# Patient Record
Sex: FEMALE | Race: WHITE | NOT HISPANIC OR LATINO | Employment: OTHER | URBAN - METROPOLITAN AREA
[De-identification: names, ages, dates, MRNs, and addresses within clinical notes are randomized per-mention and may not be internally consistent; named-entity substitution may affect disease eponyms.]

---

## 2019-12-19 ENCOUNTER — SURGERY - HEALTHEAST (OUTPATIENT)
Dept: CARDIOLOGY | Facility: CLINIC | Age: 54
End: 2019-12-19

## 2019-12-21 ASSESSMENT — MIFFLIN-ST. JEOR: SCORE: 1178.26

## 2019-12-23 ENCOUNTER — COMMUNICATION - HEALTHEAST (OUTPATIENT)
Dept: CARDIOLOGY | Facility: CLINIC | Age: 54
End: 2019-12-23

## 2019-12-23 DIAGNOSIS — I51.81 STRESS-INDUCED CARDIOMYOPATHY: ICD-10-CM

## 2019-12-27 ENCOUNTER — AMBULATORY - HEALTHEAST (OUTPATIENT)
Dept: CARDIAC REHAB | Facility: HOSPITAL | Age: 54
End: 2019-12-27

## 2019-12-27 DIAGNOSIS — I51.81 STRESS-INDUCED CARDIOMYOPATHY: ICD-10-CM

## 2019-12-27 DIAGNOSIS — Z98.890 S/P CORONARY ANGIOGRAM: ICD-10-CM

## 2020-01-07 ENCOUNTER — COMMUNICATION - HEALTHEAST (OUTPATIENT)
Dept: CARDIOLOGY | Facility: CLINIC | Age: 55
End: 2020-01-07

## 2020-01-07 ENCOUNTER — AMBULATORY - HEALTHEAST (OUTPATIENT)
Dept: CARDIAC REHAB | Facility: HOSPITAL | Age: 55
End: 2020-01-07

## 2020-01-07 DIAGNOSIS — I51.81 STRESS-INDUCED CARDIOMYOPATHY: ICD-10-CM

## 2020-01-09 ENCOUNTER — AMBULATORY - HEALTHEAST (OUTPATIENT)
Dept: CARDIAC REHAB | Facility: HOSPITAL | Age: 55
End: 2020-01-09

## 2020-01-09 DIAGNOSIS — I51.81 STRESS-INDUCED CARDIOMYOPATHY: ICD-10-CM

## 2020-01-13 ENCOUNTER — AMBULATORY - HEALTHEAST (OUTPATIENT)
Dept: CARDIAC REHAB | Facility: HOSPITAL | Age: 55
End: 2020-01-13

## 2020-01-13 DIAGNOSIS — I51.81 STRESS-INDUCED CARDIOMYOPATHY: ICD-10-CM

## 2020-01-15 ENCOUNTER — AMBULATORY - HEALTHEAST (OUTPATIENT)
Dept: CARDIAC REHAB | Facility: HOSPITAL | Age: 55
End: 2020-01-15

## 2020-01-15 DIAGNOSIS — I51.81 STRESS-INDUCED CARDIOMYOPATHY: ICD-10-CM

## 2020-01-21 ENCOUNTER — HOSPITAL ENCOUNTER (OUTPATIENT)
Dept: CARDIOLOGY | Facility: CLINIC | Age: 55
Discharge: HOME | End: 2020-01-21
Attending: INTERNAL MEDICINE

## 2020-01-21 DIAGNOSIS — I51.81 STRESS-INDUCED CARDIOMYOPATHY: ICD-10-CM

## 2020-01-21 ASSESSMENT — MIFFLIN-ST. JEOR: SCORE: 1190.96

## 2020-01-22 ENCOUNTER — COMMUNICATION - HEALTHEAST (OUTPATIENT)
Dept: CARDIOLOGY | Facility: CLINIC | Age: 55
End: 2020-01-22

## 2020-01-22 LAB
AORTIC ROOT: 3.1 CM
AORTIC VALVE MEAN VELOCITY: 86.9 CM/S
ASCENDING AORTA: 4.1 CM
AV DIMENSIONLESS INDEX VTI: 0.9
AV MEAN GRADIENT: 4 MMHG
AV PEAK GRADIENT: 8.2 MMHG
AV VALVE AREA: 3.1 CM2
AV VELOCITY RATIO: 1
BSA FOR ECHO PROCEDURE: 1.68 M2
CV BLOOD PRESSURE: ABNORMAL MMHG
CV ECHO HEIGHT: 64 IN
CV ECHO WEIGHT: 138 LBS
DOP CALC AO PEAK VEL: 143 CM/S
DOP CALC AO VTI: 28.4 CM
DOP CALC LVOT AREA: 3.46 CM2
DOP CALC LVOT DIAMETER: 2.1 CM
DOP CALC LVOT PEAK VEL: 148 CM/S
DOP CALC LVOT STROKE VOLUME: 88.6 CM3
DOP CALCLVOT PEAK VEL VTI: 25.6 CM
EJECTION FRACTION: 71 % (ref 55–75)
FRACTIONAL SHORTENING: 37.2 % (ref 28–44)
INTERVENTRICULAR SEPTUM IN END DIASTOLE: 1 CM (ref 0.6–0.9)
IVS/PW RATIO: 1
LA AREA 1: 15.4 CM2
LA AREA 2: 18.8 CM2
LEFT ATRIUM LENGTH: 4.74 CM
LEFT ATRIUM SIZE: 3.3 CM
LEFT ATRIUM VOLUME INDEX: 30.9 ML/M2
LEFT ATRIUM VOLUME: 51.9 ML
LEFT VENTRICLE CARDIAC INDEX: 3.4 L/MIN/M2
LEFT VENTRICLE CARDIAC OUTPUT: 5.7 L/MIN
LEFT VENTRICLE DIASTOLIC VOLUME INDEX: 46.4 CM3/M2 (ref 29–61)
LEFT VENTRICLE DIASTOLIC VOLUME: 78 CM3 (ref 46–106)
LEFT VENTRICLE HEART RATE: 64 BPM
LEFT VENTRICLE MASS INDEX: 84.8 G/M2
LEFT VENTRICLE SYSTOLIC VOLUME INDEX: 13.7 CM3/M2 (ref 8–24)
LEFT VENTRICLE SYSTOLIC VOLUME: 23 CM3 (ref 14–42)
LEFT VENTRICULAR INTERNAL DIMENSION IN DIASTOLE: 4.3 CM (ref 3.8–5.2)
LEFT VENTRICULAR INTERNAL DIMENSION IN SYSTOLE: 2.7 CM (ref 2.2–3.5)
LEFT VENTRICULAR MASS: 142.5 G
LEFT VENTRICULAR OUTFLOW TRACT MEAN GRADIENT: 3 MMHG
LEFT VENTRICULAR OUTFLOW TRACT MEAN VELOCITY: 80.1 CM/S
LEFT VENTRICULAR OUTFLOW TRACT PEAK GRADIENT: 9 MMHG
LEFT VENTRICULAR POSTERIOR WALL IN END DIASTOLE: 1 CM (ref 0.6–0.9)
LV STROKE VOLUME INDEX: 52.8 ML/M2
MITRAL VALVE E/A RATIO: 0.8
MV AVERAGE E/E' RATIO: 10.7 CM/S
MV DECELERATION TIME: 201 MS
MV E'TISSUE VEL-LAT: 8.09 CM/S
MV E'TISSUE VEL-MED: 4 CM/S
MV LATERAL E/E' RATIO: 8
MV MEDIAL E/E' RATIO: 16.2
MV PEAK A VELOCITY: 78.5 CM/S
MV PEAK E VELOCITY: 64.7 CM/S
NUC REST DIASTOLIC VOLUME INDEX: 2208 LBS
NUC REST SYSTOLIC VOLUME INDEX: 64 IN
TRICUSPID REGURGITATION PEAK PRESSURE GRADIENT: 14.6 MMHG
TRICUSPID VALVE ANULAR PLANE SYSTOLIC EXCURSION: 1.9 CM
TRICUSPID VALVE PEAK REGURGITANT VELOCITY: 191 CM/S

## 2020-01-27 ENCOUNTER — COMMUNICATION - HEALTHEAST (OUTPATIENT)
Dept: CARDIOLOGY | Facility: CLINIC | Age: 55
End: 2020-01-27

## 2020-01-27 ENCOUNTER — OFFICE VISIT - HEALTHEAST (OUTPATIENT)
Dept: CARDIOLOGY | Facility: CLINIC | Age: 55
End: 2020-01-27

## 2020-01-27 DIAGNOSIS — I77.819 AORTIC DILATATION (H): ICD-10-CM

## 2020-01-27 DIAGNOSIS — I51.81 STRESS-INDUCED CARDIOMYOPATHY: ICD-10-CM

## 2020-01-27 DIAGNOSIS — I10 IDIOPATHIC HYPERTENSION: ICD-10-CM

## 2020-01-27 ASSESSMENT — MIFFLIN-ST. JEOR: SCORE: 1204.57

## 2020-01-28 ENCOUNTER — COMMUNICATION - HEALTHEAST (OUTPATIENT)
Dept: CARDIOLOGY | Facility: CLINIC | Age: 55
End: 2020-01-28

## 2020-01-29 ENCOUNTER — AMBULATORY - HEALTHEAST (OUTPATIENT)
Dept: CARDIAC REHAB | Facility: HOSPITAL | Age: 55
End: 2020-01-29

## 2020-01-29 DIAGNOSIS — Z98.890 S/P CORONARY ANGIOGRAM: ICD-10-CM

## 2020-02-03 ENCOUNTER — AMBULATORY - HEALTHEAST (OUTPATIENT)
Dept: CARDIAC REHAB | Facility: HOSPITAL | Age: 55
End: 2020-02-03

## 2020-02-03 DIAGNOSIS — I51.81 STRESS-INDUCED CARDIOMYOPATHY: ICD-10-CM

## 2020-02-05 ENCOUNTER — AMBULATORY - HEALTHEAST (OUTPATIENT)
Dept: CARDIAC REHAB | Facility: HOSPITAL | Age: 55
End: 2020-02-05

## 2020-02-05 DIAGNOSIS — I51.81 STRESS-INDUCED CARDIOMYOPATHY: ICD-10-CM

## 2020-02-10 ENCOUNTER — AMBULATORY - HEALTHEAST (OUTPATIENT)
Dept: CARDIAC REHAB | Facility: HOSPITAL | Age: 55
End: 2020-02-10

## 2020-02-10 DIAGNOSIS — I51.81 STRESS-INDUCED CARDIOMYOPATHY: ICD-10-CM

## 2020-02-12 ENCOUNTER — AMBULATORY - HEALTHEAST (OUTPATIENT)
Dept: CARDIAC REHAB | Facility: HOSPITAL | Age: 55
End: 2020-02-12

## 2020-02-12 DIAGNOSIS — I51.81 STRESS-INDUCED CARDIOMYOPATHY: ICD-10-CM

## 2020-02-17 ENCOUNTER — AMBULATORY - HEALTHEAST (OUTPATIENT)
Dept: CARDIAC REHAB | Facility: HOSPITAL | Age: 55
End: 2020-02-17

## 2020-02-17 DIAGNOSIS — Z98.890 S/P CORONARY ANGIOGRAM: ICD-10-CM

## 2020-02-19 ENCOUNTER — AMBULATORY - HEALTHEAST (OUTPATIENT)
Dept: CARDIAC REHAB | Facility: HOSPITAL | Age: 55
End: 2020-02-19

## 2020-02-19 DIAGNOSIS — Z98.890 S/P CORONARY ANGIOGRAM: ICD-10-CM

## 2020-02-24 ENCOUNTER — AMBULATORY - HEALTHEAST (OUTPATIENT)
Dept: CARDIAC REHAB | Facility: HOSPITAL | Age: 55
End: 2020-02-24

## 2020-02-24 DIAGNOSIS — Z98.890 S/P CORONARY ANGIOGRAM: ICD-10-CM

## 2020-03-12 ENCOUNTER — COMMUNICATION - HEALTHEAST (OUTPATIENT)
Dept: CARDIOLOGY | Facility: CLINIC | Age: 55
End: 2020-03-12

## 2020-03-16 ENCOUNTER — OFFICE VISIT - HEALTHEAST (OUTPATIENT)
Dept: CARDIOLOGY | Facility: CLINIC | Age: 55
End: 2020-03-16

## 2020-03-16 ENCOUNTER — HOSPITAL ENCOUNTER (OUTPATIENT)
Dept: RADIOLOGY | Facility: CLINIC | Age: 55
Discharge: HOME | End: 2020-03-16
Attending: INTERNAL MEDICINE

## 2020-03-16 DIAGNOSIS — R07.2 PRECORDIAL PAIN: ICD-10-CM

## 2020-03-16 ASSESSMENT — MIFFLIN-ST. JEOR: SCORE: 1192.32

## 2020-03-17 ENCOUNTER — COMMUNICATION - HEALTHEAST (OUTPATIENT)
Dept: CARDIOLOGY | Facility: CLINIC | Age: 55
End: 2020-03-17

## 2020-06-08 ENCOUNTER — COMMUNICATION - HEALTHEAST (OUTPATIENT)
Dept: CARDIOLOGY | Facility: CLINIC | Age: 55
End: 2020-06-08

## 2020-06-08 ENCOUNTER — AMBULATORY - HEALTHEAST (OUTPATIENT)
Dept: CARDIOLOGY | Facility: CLINIC | Age: 55
End: 2020-06-08

## 2020-06-08 DIAGNOSIS — I51.81 STRESS-INDUCED CARDIOMYOPATHY: ICD-10-CM

## 2020-06-18 ENCOUNTER — AMBULATORY - HEALTHEAST (OUTPATIENT)
Dept: CARDIOLOGY | Facility: CLINIC | Age: 55
End: 2020-06-18

## 2020-06-18 ENCOUNTER — COMMUNICATION - HEALTHEAST (OUTPATIENT)
Dept: CARDIOLOGY | Facility: CLINIC | Age: 55
End: 2020-06-18

## 2020-06-18 DIAGNOSIS — I77.819 AORTIC DILATATION (H): ICD-10-CM

## 2020-06-18 DIAGNOSIS — I51.81 STRESS-INDUCED CARDIOMYOPATHY: ICD-10-CM

## 2020-06-18 DIAGNOSIS — R07.2 PRECORDIAL PAIN: ICD-10-CM

## 2020-06-23 ENCOUNTER — HOSPITAL ENCOUNTER (OUTPATIENT)
Dept: CARDIOLOGY | Facility: HOSPITAL | Age: 55
Discharge: HOME | End: 2020-06-23
Attending: INTERNAL MEDICINE

## 2020-06-23 DIAGNOSIS — I77.819 AORTIC DILATATION (H): ICD-10-CM

## 2020-06-23 DIAGNOSIS — R07.2 PRECORDIAL PAIN: ICD-10-CM

## 2020-06-23 DIAGNOSIS — I51.81 STRESS-INDUCED CARDIOMYOPATHY: ICD-10-CM

## 2020-06-23 LAB
AORTIC ROOT: 3.4 CM
DOP CALC LVOT AREA: 3.46 CM2
DOP CALC LVOT DIAMETER: 2.1 CM
DOP CALC LVOT PEAK VEL: 111 CM/S
DOP CALC LVOT STROKE VOLUME: 68.9 CM3
DOP CALCLVOT PEAK VEL VTI: 19.9 CM
EJECTION FRACTION: 68 % (ref 55–75)
FRACTIONAL SHORTENING: 46.3 % (ref 28–44)
INTERVENTRICULAR SEPTUM IN END DIASTOLE: 1.18 CM (ref 0.6–0.9)
IVS/PW RATIO: 1.3
LA AREA 1: 8 CM2
LA AREA 2: 14 CM2
LEFT ATRIUM LENGTH: 3.4 CM
LEFT ATRIUM SIZE: 3 CM
LEFT ATRIUM TO AORTIC ROOT RATIO: 0.88 NO UNITS
LEFT ATRIUM VOLUME: 28 ML
LEFT VENTRICLE DIASTOLIC VOLUME: 81.2 CM3 (ref 46–106)
LEFT VENTRICLE SYSTOLIC VOLUME: 26.3 CM3 (ref 14–42)
LEFT VENTRICULAR INTERNAL DIMENSION IN DIASTOLE: 4.49 CM (ref 3.8–5.2)
LEFT VENTRICULAR INTERNAL DIMENSION IN SYSTOLE: 2.41 CM (ref 2.2–3.5)
LEFT VENTRICULAR MASS: 161.5 G
LEFT VENTRICULAR OUTFLOW TRACT MEAN GRADIENT: 3 MMHG
LEFT VENTRICULAR OUTFLOW TRACT MEAN VELOCITY: 84.5 CM/S
LEFT VENTRICULAR OUTFLOW TRACT PEAK GRADIENT: 5 MMHG
LEFT VENTRICULAR POSTERIOR WALL IN END DIASTOLE: 0.9 CM (ref 0.6–0.9)
MITRAL VALVE E/A RATIO: 1
MV AVERAGE E/E' RATIO: 8.4 CM/S
MV DECELERATION TIME: 306 MS
MV E'TISSUE VEL-LAT: 10.5 CM/S
MV E'TISSUE VEL-MED: 6.58 CM/S
MV LATERAL E/E' RATIO: 6.8
MV MEDIAL E/E' RATIO: 10.9
MV PEAK A VELOCITY: 72.3 CM/S
MV PEAK E VELOCITY: 71.8 CM/S
TRICUSPID VALVE ANULAR PLANE SYSTOLIC EXCURSION: 2.4 CM

## 2020-06-23 ASSESSMENT — MIFFLIN-ST. JEOR: SCORE: 1190.96

## 2020-06-24 ENCOUNTER — COMMUNICATION - HEALTHEAST (OUTPATIENT)
Dept: CARDIOLOGY | Facility: CLINIC | Age: 55
End: 2020-06-24

## 2020-07-28 ENCOUNTER — OFFICE VISIT - HEALTHEAST (OUTPATIENT)
Dept: CARDIOLOGY | Facility: CLINIC | Age: 55
End: 2020-07-28

## 2020-07-28 DIAGNOSIS — I51.81 STRESS-INDUCED CARDIOMYOPATHY: ICD-10-CM

## 2020-07-28 DIAGNOSIS — I10 IDIOPATHIC HYPERTENSION: ICD-10-CM

## 2020-07-28 DIAGNOSIS — I77.819 AORTIC DILATATION (H): ICD-10-CM

## 2020-07-28 RX ORDER — ESCITALOPRAM OXALATE 10 MG/1
10 TABLET ORAL DAILY
Status: SHIPPED | COMMUNITY
Start: 2020-06-17

## 2021-01-21 ENCOUNTER — COMMUNICATION - HEALTHEAST (OUTPATIENT)
Dept: CARDIOLOGY | Facility: CLINIC | Age: 56
End: 2021-01-21

## 2021-01-21 DIAGNOSIS — I10 IDIOPATHIC HYPERTENSION: ICD-10-CM

## 2021-02-17 ENCOUNTER — COMMUNICATION - HEALTHEAST (OUTPATIENT)
Dept: CARDIOLOGY | Facility: CLINIC | Age: 56
End: 2021-02-17

## 2021-02-17 DIAGNOSIS — I51.81 STRESS-INDUCED CARDIOMYOPATHY: ICD-10-CM

## 2021-02-17 DIAGNOSIS — I77.819 AORTIC DILATATION (H): ICD-10-CM

## 2021-02-17 DIAGNOSIS — I51.7 LEFT VENTRICULAR HYPERTROPHY: ICD-10-CM

## 2021-03-12 ENCOUNTER — HOSPITAL ENCOUNTER (OUTPATIENT)
Dept: MRI IMAGING | Facility: HOSPITAL | Age: 56
Discharge: HOME | End: 2021-03-12
Attending: INTERNAL MEDICINE

## 2021-03-12 DIAGNOSIS — I77.819 AORTIC DILATATION (H): ICD-10-CM

## 2021-03-12 DIAGNOSIS — I51.7 LEFT VENTRICULAR HYPERTROPHY: ICD-10-CM

## 2021-03-12 DIAGNOSIS — I51.81 STRESS-INDUCED CARDIOMYOPATHY: ICD-10-CM

## 2021-03-12 LAB
CCTA EJECTION FRACTION: 82 %
CCTA INTERVENTRICULAR SETPUM: 0.7 CM (ref 0.6–1.1)
CCTA LEFT INTERNAL DIMENSION IN SYSTOLE: 2.6 CM (ref 2.1–4)
CCTA LEFT VENTRICULAR INTERNAL DIMENSION IN DIASTOLE: 5 CM (ref 3.5–6)
CCTA LEFT VENTRICULAR MASS: 104.64 G
CCTA POSTERIOR WALL: 0.6 CM (ref 0.6–1.1)
MR CARDIAC LEFT VENTRIAL CARDIAC INDEX: 2.6 L/MIN/M2 (ref 1.75–3.8)
MR CARDIAC LEFT VENTRICAL CARDIAC OUTPUT: 4.27 L/MIN (ref 2.8–8.8)
MR CARDIAC LEFT VENTRICULAR DIASTOLIC VOLUME INDEX: 62.84 ML/M2 (ref 41–81)
MR CARDIAC LEFT VENTRICULAR MASS INDEX: 66.43 G/M2 (ref 63–95)
MR CARDIAC LEFT VENTRICULAR MASS: 111 G (ref 75–175)
MR CARDIAC LEFT VENTRICULAR STROKE VOLUME INDEX: 45.48 ML/M2 (ref 26–56)
MR CARDIAC LEFT VENTRICULAR SYSTOLIC VOLUME INDEX: 17.36 ML/M2 (ref 12–20)
MR EJECTION FRACTION: 72.38 %
MR HEIGHT: 1.63 M
MR LEFT VENTRICULAR DYSTOLIC VOLUMEN: 105 ML (ref 52–141)
MR LEFT VENTRICULAR STROKE VOLUMEN: 76 ML (ref 33–97)
MR LEFT VENTRICULAR SYSTOLIC VOLUME: 29 ML (ref 13–51)
MR WEIGHT: 62.6 KG

## 2021-03-16 ENCOUNTER — COMMUNICATION - HEALTHEAST (OUTPATIENT)
Dept: CARDIOLOGY | Facility: CLINIC | Age: 56
End: 2021-03-16

## 2021-03-30 ENCOUNTER — OFFICE VISIT - HEALTHEAST (OUTPATIENT)
Dept: CARDIOLOGY | Facility: CLINIC | Age: 56
End: 2021-03-30

## 2021-03-30 DIAGNOSIS — I10 IDIOPATHIC HYPERTENSION: ICD-10-CM

## 2021-03-30 RX ORDER — LOSARTAN POTASSIUM 50 MG/1
50 TABLET ORAL DAILY
Qty: 30 TABLET | Refills: 12 | Status: SHIPPED | OUTPATIENT
Start: 2021-03-30

## 2021-03-30 ASSESSMENT — MIFFLIN-ST. JEOR: SCORE: 1209.11

## 2021-04-20 ENCOUNTER — TRANSFERRED RECORDS (OUTPATIENT)
Dept: HEALTH INFORMATION MANAGEMENT | Facility: CLINIC | Age: 56
End: 2021-04-20

## 2021-06-04 VITALS
RESPIRATION RATE: 14 BRPM | BODY MASS INDEX: 24.07 KG/M2 | SYSTOLIC BLOOD PRESSURE: 128 MMHG | DIASTOLIC BLOOD PRESSURE: 80 MMHG | WEIGHT: 141 LBS | HEART RATE: 69 BPM | HEIGHT: 64 IN

## 2021-06-04 VITALS — HEIGHT: 64 IN | WEIGHT: 138 LBS | BODY MASS INDEX: 23.56 KG/M2

## 2021-06-04 VITALS — WEIGHT: 137 LBS | BODY MASS INDEX: 23.52 KG/M2

## 2021-06-04 VITALS — BODY MASS INDEX: 23.52 KG/M2 | WEIGHT: 137 LBS

## 2021-06-04 VITALS — BODY MASS INDEX: 23.56 KG/M2 | WEIGHT: 138 LBS | HEIGHT: 64 IN

## 2021-06-04 VITALS
HEART RATE: 84 BPM | WEIGHT: 138.3 LBS | SYSTOLIC BLOOD PRESSURE: 112 MMHG | RESPIRATION RATE: 20 BRPM | HEIGHT: 64 IN | BODY MASS INDEX: 23.61 KG/M2 | DIASTOLIC BLOOD PRESSURE: 80 MMHG

## 2021-06-04 VITALS — WEIGHT: 136 LBS | BODY MASS INDEX: 23.34 KG/M2

## 2021-06-04 VITALS — BODY MASS INDEX: 23.08 KG/M2 | WEIGHT: 135.2 LBS | HEIGHT: 64 IN

## 2021-06-04 VITALS — BODY MASS INDEX: 23.69 KG/M2 | WEIGHT: 138 LBS

## 2021-06-04 VITALS — WEIGHT: 138 LBS | BODY MASS INDEX: 23.69 KG/M2

## 2021-06-04 NOTE — TELEPHONE ENCOUNTER
Received a call back from patient. She was transferred to scheduling to have repeat echo prior to seeing ROSA in 4 weeks. She has a post-angio follow up with Jazmín on 1/7/20. Will inquire if this appt is appropriate in addition to appt with ROSA on 1/27/20.      Christine Banerjee plans to follow up with her PMD and then you on 1/27/20 with an echocardiogram on 1/20/20 prior. She has an appt with Jazmín post-pci on 1/7/20. Does she need this appt?  Thanks,  Mal

## 2021-06-04 NOTE — TELEPHONE ENCOUNTER
Received a message from Dr. Diaz on Hospital Physician Discharge line with orders for patient to see WTZ in 4 weeks post-discharge and to have an echocardiogram prior to this due to stress-induced cardiomyopathy. Order placed for echo. LM with patient to assist with appt and echo. -[facility] [place]

## 2021-06-04 NOTE — PROGRESS NOTES
ITP ASSESSMENT   Assessment Day: Initial    Session Number: 1--2  Precautions: Standard    Diagnosis: Other (Stress Induced Cardiomyopathy)    Risk Stratification: Medium    Referring Provider: Vinayak Beckman MD  EXERCISE  Exercise Assessment: Initial       6 Minute Walk Test   Pre   Pre Exercise HR: 73                    Pre Exercise BP: 125/85      Peak  Peak HR: 102                   Peak BP: 146/77    Peak feet: 1600    Peak O2 SAT: 100    Peak RPE: 4    Peak MPH: 3.03      Symptoms:  Peak Symptoms: none      5 mins. Post  5 Min Post HR: 74    5 Min Post BP: 124/77                           Exercise Plan  Goals Next 30 days   STG-- Pt will tolerate CR for 40 mins at 3.5-4.5 met levels, 2-3 x a week without sx's or EKG changes. Pt will resume exercise at the gym for 40-45 mins. LTG-- Pt will resume all housework, PT work at a [employer], play pickleball with met levels of 5-6.       Education Goals: All goals in this section met    Education Goals Met: Patient can state cardiac s/s and appropriate emergency response.;Has system for taking medication.;Medication review.      Exercise Prescription  Exercise Mode: Treadmill;Bike;Nustep;Arm Erg.    Frequency: 2-3 x week    Duration: 40 min    Intensity / THR: 20-30 beats above resting heart rate (or THR )    RPE 11-14  Progression / Met level: 3.5-4.5    Resistive Training?: Yes      Current Exercise (mins/week): 5      Interventions  Home Exercise:  Mode: TM    Frequency: 3-4 x week    Duration: 30-40 min      Education Material : Educational videos;Provide written material;Individual education and counseling;Offer educational classes      Education Completed  Exercise Education Completed: Cardiac Anatomy;Signs and Symptoms;Medication review;RPE;Emergency Plan;Home Exercise;Warm up/cool down;FITT Principles;BP/HR Reponse to exercise;Benefits of Exercise;End point of exercise              Exercise Follow-up/Discharge  Follow up/Discharge: Skilled therapy  "needed to monitor for CV sx's and EKG changes with increasing workloads. Provide education and support for risk factor management/Stress management and resuming of home exercise after heart event.   NUTRITION  Nutrition Assessment: Initial      Nutrition Risk Factors:  Nutrition Risk Factors: NA      Nutrition Plan  Interventions  Other Nutrition Intervention: Diet Class;Therapist/Pt Discussion;Educational Videos;Provide with Written Material      Education Completed  Nutrition Education Completed: Risk factor overview      Goals  Nutrition Goals (Next 30 days): Patient will follow a low sodium diet;Patient will follow a low saturated fat diet      Goals Met  Nutrition Goals Met: Completed Nutritional Risk Screen;Provided Rate your Plate Survey;Reviewed Dietitian schedule      Height, Weight, and  BMI  Weight: 138 lb (62.6 kg)  Height: 5' 4\" (1.626 m)  BMI: 23.68      Nutrition Follow-up  Follow-up/Discharge: Enc pt to return diet suvey and consult with the dietitian         Other Risk Factors  Other Risk Factor Assessment: Initial      HTN Risk Factor: Hypertension      Pre Exercise BP: 125/85  Post Exercise BP: 124/77      Hypertension Plan  Goals  HTN Goals: Follow low sodium diet;Take medication as prescribed;Exercises regularly      Goals Met  HTN Goals Met: Take medication as prescribed      HTN Interventions  HTN Interventions: Diet consult;Therapist/patient discussion;Provide written material;Offer educational videos;Offer educational classes      HTN Education Completed  HTN Education Completed: Medication review;Risk factor overview      Tobacco Risk Factor: NA      Risk Factor Follow-up   Follow-up/Discharge: Provide education and support for risk factor management and stress management     PSYCHOSOCIAL  Psychosocial Assessment: Initial       [employer] Q of L Summary Score: 17      PHQ-9 Total Score: 3      Psychosocial Risk Factor: Stress      Psychosocial Plan  Interventions    Interventions: Offer " educational videos and classes;Provide written material;Individual education and counseling       Education Completed  Education Completed: Relaxation/Coping Techniques;S/S of depression;Effects of stress on body      Goals  Goals (Next 30 days): Identify stressors;Improvement in [employer] CO[employer] score;Practicing stress management skills      Goals Met  Goals Met: Identified Support system;Oriented to stress management classes      Psychosocial Follow-up  Follow-up/Discharge: BAYLEE survey given and results discussed with pt. Pt will choose a stress management technique to practice and will also attend Stress management class.         Patient involved in Goal setting?: Yes      Signature: _____________________________________________________________    Date: __________________    Time: __________________See Doc Flowsheet

## 2021-06-04 NOTE — PROGRESS NOTES
Cardiac Rehab  Phase II Assessment    Assessment Date: 12-27-19    Diagnosis: Stress Induced Cardiomyopathy  Date of Onset: 12-19-19  Procedure: Angiogram- No CAD  Date of Onset: 12-19-19  ICD/Pacemaker: No   Post-procedure Complications: none  ECG History: NSR/ST   EF%:41%  Past Medical History:   Patient Active Problem List   Diagnosis     Stress-induced cardiomyopathy     Idiopathic hypertension     Past Medical History:   Diagnosis Date     Chest pain with normal coronary angiography 12/19/2019     Past Surgical History:   Procedure Laterality Date     CV CORONARY ANGIOGRAM N/A 12/19/2019    Procedure: Coronary Angiogram;  Surgeon: Vinayak Beckman MD;  Location: [facility];  Service: Cardiology     CV LEFT HEART CATHETERIZATION WITH LEFT VENTRICULOGRAM N/A 12/19/2019    Procedure: Left Heart Catheterization with Left Ventriculogram;  Surgeon: Vinayak Beckman MD;  Location: [facility];  Service: Cardiology         Physical Assessment  Precautions/ Physical Limitations: Standard  Oxygen: No  O2 Sats: 100% Lung Sounds: Clear Edema: none  Incisions: na  Sleeping Pattern: good   Appetite: good   Nutrition Risk Screen: no risk at this time    Pain  Location: none  Intensity: (0-10 scale) 0      Psychosocial/ Emotional Health  1. In the past 12 months, have you been in a relationship where you have been abused physically, emotionally, sexually or financially? No  notified: NA  2. Who do you turn to for emotional support?:   3. Do you have cultural or spiritual needs? No  4. Have there been any major life changes in the past 12 months? No    Referral Information  Primary Physician: Krystal Lr PA-C  Cardiologist: Emily  Surgeon: len    Home exercise/Equipment: Belongs to a gym    Patient's long-term goal(s): Return to all previous activities     1. Living Accommodations: [facility] Steps: Yes      Support people at home:    2. Marital Status:   3.  Family is able to assist with cares      Yazidism/Community involvement: no  4. Recreation/Hobbies: Travel, Pickle Ball        See Doc Flowsheet

## 2021-06-04 NOTE — TELEPHONE ENCOUNTER
Received a voicemail back from Christine giving permission to cancel 1/7/19 appt with JOANNE in post-intervention follow up. Called schedulers and this appt was removed. She will follow up as schedule with WTHALLE and echo prior. -[facility] [place]

## 2021-06-05 VITALS
DIASTOLIC BLOOD PRESSURE: 92 MMHG | RESPIRATION RATE: 14 BRPM | HEIGHT: 64 IN | SYSTOLIC BLOOD PRESSURE: 140 MMHG | WEIGHT: 142 LBS | BODY MASS INDEX: 24.24 KG/M2 | HEART RATE: 68 BPM

## 2021-06-05 NOTE — PROGRESS NOTES
ITP ASSESSMENT   Assessment Day: 30 Day    Session Number: 6  Precautions: Standard    Diagnosis: Other (Stress Induced Cardiomyopathy)    Risk Stratification: Medium    Referring Provider: Krystal Lr PA-C   ITP: Dr. Shine  EXERCISE  Exercise Assessment: Reassessment    Pt tolerates 30-40 minutes of exercise at 4.2 mets without symptoms or ekg changes.                         Exercise Plan  Goals Next 30 days  ADL'S: STG-- Pt will tolerate CR for 40 mins at 4.2-5.2 met levels, 2-3 x a week without sx's or EKG changes. Pt will resume exercise at the gym for 40-45 mins. LTG-- Pt will resume all housework, PT work at a [employer], play pickleball with met levels of 5-6.     Education Goals: All goals in this section met    Education Goals Met: Patient can state cardiac s/s and appropriate emergency response.;Has system for taking medication.;Medication review.                        Goals Met  Initial ADL's goals met: Goal Met - Pt is exercising at 4.2 mets without complaints.    Initial Progression: Pt is progressing towards goals as tolerated.    Exercise Prescription  Exercise Mode: Treadmill;Bike;Nustep;Arm Erg.    Frequency: 2-3x/week    Duration: 40 Minutes    Intensity / THR: 20-30 beats above resting heart rate ()    RPE 11-14  Progression / Met level: 4.2-5.2    Resistive Training?: Yes    Current Exercise (mins/week): 100    Interventions  Home Exercise:  Mode: Walk    Frequency: 4-6x/week    Duration: 30-40 Minutes    Education Material : Educational videos;Provide written material;Individual education and counseling;Offer educational classes    Education Completed  Exercise Education Completed: Cardiac Anatomy;Signs and Symptoms;Medication review;RPE;Emergency Plan;Home Exercise;Warm up/cool down;FITT Principles;BP/HR Reponse to exercise;Benefits of Exercise;End point of exercise            Exercise Follow-up/Discharge  Follow up/Discharge: Skilled therapy needed to monitor for CV sx's and EKG  "changes with increasing workloads. Provide education and support for risk factor management/Stress management and resuming of home exercise after heart event.   NUTRITION  Nutrition Assessment: Reassessment    Nutrition Risk Factors:  Nutrition Risk Factors: NA    Nutrition Plan  Interventions  Other Nutrition Intervention: Diet Class;Therapist/Pt Discussion;Educational Videos;Provide with Written Material    Education Completed  Nutrition Education Completed: Risk factor overview    Goals  Nutrition Goals (Next 30 days): Patient will follow a low sodium diet;Patient will follow a low saturated fat diet    Goals Met  Nutrition Goals Met: Completed Nutritional Risk Screen;Provided Rate your Plate Survey;Reviewed Dietitian schedule    Height, Weight, and  BMI  Weight: 138 lb (62.6 kg)  Height: 5' 4\" (1.626 m)  BMI: 23.68    Nutrition Follow-up  Follow-up/Discharge: Enc pt to return diet suvey and consult with the dietitian       Other Risk Factors  Other Risk Factor Assessment: Reassessment    HTN Risk Factor: Hypertension    Pre Exercise BP: 108/68  Post Exercise BP: 110/70    Hypertension Plan  Goals  HTN Goals: Follow low sodium diet;Take medication as prescribed;Exercises regularly    Goals Met  HTN Goals Met: Take medication as prescribed    HTN Interventions  HTN Interventions: Diet consult;Therapist/patient discussion;Provide written material;Offer educational videos;Offer educational classes    HTN Education Completed  HTN Education Completed: Medication review;Risk factor overview      Tobacco Risk Factor: NA    Risk Factor Follow-up   Follow-up/Discharge: Provide education and support for risk factor management and stress management   PSYCHOSOCIAL  Psychosocial Assessment: Reassessment    [facility] Q of L Summary Score: 17    PHQ-9 Total Score: 3    Psychosocial Risk Factor: Stress    Psychosocial Plan  Interventions  Interventions: Offer educational videos and classes;Provide written " material;Individual education and counseling    Education Completed  Education Completed: Relaxation/Coping Techniques;S/S of depression;Effects of stress on body    Goals  Goals (Next 30 days): Identify stressors;Improvement in [employer] CO[employer] score;Practicing stress management skills    Goals Met  Goals Met: Identified Support system;Oriented to stress management classes    Psychosocial Follow-up  Follow-up/Discharge: BAYLEE survey given and results discussed with pt. Pt will choose a stress management technique to practice and will also attend Stress management class.       Patient involved in Goal setting?: No      Signature: _____________________________________________________________    Date: __________________    Time: __________________

## 2021-06-05 NOTE — TELEPHONE ENCOUNTER
"----- Message from Franck Diaz MD (Ted) sent at 1/28/2020  3:12 PM CST -----  Regarding: RE: ativan    I meant \"give her Ativan 1 mg nightly as needed\"  ----- Message -----  From: Trish Salvador RN  Sent: 1/28/2020   2:41 PM CST  To: Franck Diaz MD (Ted)  Subject: RE: ativan                                       Ok. When you say gave- does this mean you already called it it or I should call in 1 mg? Qty 2 tabs.  ----- Message -----  From: Franck Diaz MD (Ted)  Sent: 1/28/2020   2:01 PM CST  To: Trish Salvador RN  Subject: RE: ativan                                       I gave her 1 mg instead  ----- Message -----  From: Trish Salvador RN  Sent: 1/28/2020   1:28 PM CST  To: Franck Diaz MD (Ted)  Subject: ativan                                           Hi,  I responded to Christine to see if your reassurance was sufficient more or less. I would need to call in the Ativan to her pharmacy but are we sure about 2 mg?Just double checking! Call me if needed and I can explain better.  Thanks,  Mal         Called in Rx for ativan 1 mg at bedtime for anxiousness and sleep with a Qty of 2 tablets. Pharmacist took call in. Updated Christine and she verbalized understanding of instruction and thanked writer. She will start with half a tablet. She says that was her dose approximately 5 years ago. She will follow up with PMD for further anxiety management. [place] [place]                "

## 2021-06-05 NOTE — PATIENT INSTRUCTIONS - HE
Christine Somers,    It was a pleasure to see you today at the [facility].     My recommendations after this visit include:    start  losartan for BP and enlarged aorta  Echo in 6 months    RAIMUNDO Diaz MD, FACC, [facility]

## 2021-06-06 NOTE — PROGRESS NOTES
ITP ASSESSMENT   Assessment Day: 60 Day    Session Number: 13  Precautions: Standard    Diagnosis: Other (Stress Induced Cardiomyopathy)    Risk Stratification: Medium    Referring Provider: Krystal Lr PA-C   ITP: Dr. Shine  EXERCISE  Exercise Assessment: Reassessment    Pt currently tolerates 40 minutes of exercise at 5.2 mets without cardiac symptoms or ekg changes.                         Exercise Plan  Goals Next 30 days  ADL'S: STG-- Pt will resume exercise at the gym for 40-45 mins, 4-7x/week at 5.2-6 mets level. LTG-- Pt will resume all housework, PT work at a [employer], play pickleball with met levels of 5-6.     Education Goals: All goals in this section met    Education Goals Met: Patient can state cardiac s/s and appropriate emergency response.;Has system for taking medication.;Medication review.                        Goals Met  30 day ADL'S goals met: Goal Met - Pt is exercising at 4.9-5 mets for 40 minutes and has resumed most activities as tolerated.    30 Day Progression: Pt is progressing well towards her goals, pt has resumed working PT, resuming household chores as tolerated and has returned to the gym on day she is not at [facility].    Initial ADL's goals met: Goal Met - Pt is exercising at 4.2 mets without complaints.    Initial Progression: Pt is progressing towards goals as tolerated.      Exercise Prescription  Exercise Mode: Treadmill;Bike;Nustep;Arm Erg.    Frequency: 2-3x/week    Duration: 40 Minutres    Intensity / THR: 20-30 beats above resting heart rate ()    RPE 11-14  Progression / Met level: 5.2-6    Resistive Training?: Yes    Current Exercise (mins/week): 120    Interventions  Home Exercise:  Mode: Walk    Frequency: 4-6x/week    Duration: 30-40 Minutes    Education Material : Educational videos;Provide written material;Individual education and counseling;Offer educational classes    Education Completed  Exercise Education Completed: Cardiac Anatomy;Signs and  "Symptoms;Medication review;RPE;Emergency Plan;Home Exercise;Warm up/cool down;FITT Principles;BP/HR Reponse to exercise;Benefits of Exercise;End point of exercise            Exercise Follow-up/Discharge  Follow up/Discharge: Skilled therapy needed to monitor for CV sx's and EKG changes with increasing workloads. Provide education and support for risk factor management/Stress management and resuming of home exercise after heart event.   NUTRITION  Nutrition Assessment: Reassessment    Nutrition Risk Factors:  Nutrition Risk Factors: NA    Nutrition Plan  Interventions  Other Nutrition Intervention: Diet Class;Therapist/Pt Discussion;Educational Videos;Provide with Written Material    Education Completed  Nutrition Education Completed: Risk factor overview    Goals  Nutrition Goals (Next 30 days): Patient will follow a low sodium diet;Patient will follow a low saturated fat diet    Goals Met  Nutrition Goals Met: Completed Nutritional Risk Screen;Provided Rate your Plate Survey;Reviewed Dietitian schedule    Height, Weight, and  BMI  Weight: 137 lb (62.1 kg)  Height: 5' 4\" (1.626 m)  BMI: 23.5      Nutrition Follow-up  Follow-up/Discharge: Enc pt to return diet suvey and consult with the dietitian         Other Risk Factors  Other Risk Factor Assessment: Reassessment    HTN Risk Factor: Hypertension    Pre Exercise BP: 104/64  Post Exercise BP: 110/76    Hypertension Plan  Goals  HTN Goals: Follow low sodium diet    Goals Met  HTN Goals Met: Take medication as prescribed;Exercises regularly    HTN Interventions  HTN Interventions: Diet consult;Therapist/patient discussion;Provide written material;Offer educational videos;Offer educational classes    HTN Education Completed  HTN Education Completed: Medication review;Risk factor overview      Tobacco Risk Factor: NA    Risk Factor Follow-up   Follow-up/Discharge: Provide education and support for risk factor management and stress management   PSYCHOSOCIAL  Psychosocial " Assessment: Reassessment      Psychosocial Risk Factor: Stress    Psychosocial Plan  Interventions  Interventions: Offer educational videos and classes;Provide written material;Individual education and counseling    Education Completed  Education Completed: Relaxation/Coping Techniques;S/S of depression;Effects of stress on body    Goals  Goals (Next 30 days): Identify stressors;Improvement in [employer] CO[employer] score;Practicing stress management skills    Goals Met  Goals Met: Identified Support system;Oriented to stress management classes    Psychosocial Follow-up  Follow-up/Discharge: BAYLEE survey given and results discussed with pt. Pt will choose a stress management technique to practice and will also attend Stress management class.             Patient involved in Goal setting?: Yes      Signature: _____________________________________________________________    Date: __________________    Time: __________________

## 2021-06-06 NOTE — PROGRESS NOTES
ITP ASSESSMENT   Assessment Day: 90 Day    Session Number: 14  Precautions: Standard     Diagnosis: Other (Stress Induced Cardiomyopathy)    Risk Stratification: Medium    Referring Provider: Krystal Lr PA-C  EXERCISE  Exercise Assessment: Reassessment    Tolerates 40' of exercise at 4.9 mets                           Exercise Plan  Goals Next 30 days  : STG-- Pt will resume exercise at the gym for 40-45 mins, 4-7x/week at 5.2-6 mets level. LTG-- Pt will resume all housework, PT work at a [employer], play pickleball with met levels of 5-6.       Education Goals: All goals in this section met    Education Goals Met: Patient can state cardiac s/s and appropriate emergency response.;Has system for taking medication.;Medication review.                          Goals Met  60 day ADL'S goals met: Will evaluate  goals when pt returns  she has been off since 2/24/2020    60 Day Progression: Will evaluate goals when pt returs       30 day ADL'S goals met: Goal Met - Pt is exercising at 4.9-5 mets for 40 minutes and has resumed most activities as tolerated.  30 Day Progression: Pt is progressing well towards her goals, pt has resumed working PT, resuming household chores as tolerated and has returned to the gym on day she is not at [facility].      Initial ADL's goals met: Goal Met - Pt is exercising at 4.2 mets without complaints.    Initial Progression: Pt is progressing towards goals as tolerated.      Exercise Prescription  Exercise Mode: Treadmill;Bike;Nustep;Arm Erg.    Frequency: 2-3 x weekly    Duration: 40'    Intensity / THR: 20-30 beats above resting heart rate ()    RPE 11-14  Progression / Met level: 4.9-6    Resistive Training?: Yes      Current Exercise (mins/week): 120      Interventions  Home Exercise:  Mode: Walk    Frequency: 4-6 x week    Duration: 40'      Education Material : Educational videos;Provide written material;Individual education and counseling;Offer educational classes      Education  "Completed  Exercise Education Completed: Cardiac Anatomy;Signs and Symptoms;Medication review;RPE;Emergency Plan;Home Exercise;Warm up/cool down;FITT Principles;BP/HR Reponse to exercise;Benefits of Exercise;End point of exercise              Exercise Follow-up/Discharge  Follow up/Discharge: Skilled therapy needed to monitor for CV sx's and EKG changes with increasing workloads. Provide education and support for risk factor management/Stress management and resuming of home exercise after heart event.   NUTRITION  Nutrition Assessment: Reassessment      Nutrition Risk Factors:  Nutrition Risk Factors: NA      Nutrition Plan  Interventions  Other Nutrition Intervention: Diet Class;Therapist/Pt Discussion;Educational Videos;Provide with Written Material      Education Completed  Nutrition Education Completed: Risk factor overview      Goals  Nutrition Goals (Next 30 days): Patient will follow a low sodium diet;Patient will follow a low saturated fat diet      Goals Met  Nutrition Goals Met: Completed Nutritional Risk Screen;Provided Rate your Plate Survey;Reviewed Dietitian schedule      Height, Weight, and  BMI  Weight: 137 lb (62.1 kg) (137lb)  Height: 5' 4\" (1.626 m)  BMI: 23.5      Nutrition Follow-up  Follow-up/Discharge: Enc pt to return diet suvey and consult with the dietitian         Other Risk Factors  Other Risk Factor Assessment: Reassessment      HTN Risk Factor: Hypertension      Pre Exercise BP: 110/72  Post Exercise BP: 102/68      Hypertension Plan  Goals  HTN Goals: Follow low sodium diet      Goals Met  HTN Goals Met: Take medication as prescribed;Exercises regularly      HTN Interventions  HTN Interventions: Diet consult;Therapist/patient discussion;Provide written material;Offer educational videos;Offer educational classes      HTN Education Completed  HTN Education Completed: Medication review;Risk factor overview      Tobacco Risk Factor: NA        Risk Factor Follow-up   Follow-up/Discharge: " Will provide ed and suupport   as needed     PSYCHOSOCIAL  Psychosocial Assessment: Reassessment      Psychosocial Risk Factor: Stress      Psychosocial Plan  Interventions  Interventions: Offer educational videos and classes;Provide written material;Individual education and counseling      Education Completed  Education Completed: Relaxation/Coping Techniques;S/S of depression;Effects of stress on body      Goals  Goals (Next 30 days): Identify stressors;[facility];Practicing stress management skills      Goals Met  Goals Met: Identified Support system;Oriented to stress management classes      Psychosocial Follow-up  Follow-up/Discharge: Will review stress/ relaxation techniqes when pt resumes             Patient involved in Goal setting?: No      Signature: _____________________________________________________________    Date: __________________    Time: __________________

## 2021-06-06 NOTE — PATIENT INSTRUCTIONS - HE
Christine Somers,    It was a pleasure to see you today at the [facility].     My recommendations after this visit include:    Chest pain is suspicious for musculoskeletal origin  Chest x-ray    RAIMUNDO Diaz MD, FACC, [facility]

## 2021-06-07 NOTE — PROGRESS NOTES
ITP ASSESSMENT   Assessment Day: 120 Day    Session Number: 0  Precautions: Standard    Diagnosis: Other (Stress Induced Cardiomyopathy)    Risk Stratification: Medium    Referring Provider: Krystal Lr PA-C  EXERCISE  Exercise Assessment: Reassessment    Pt on hold due to COVID-19 precautions, no changes from previous ITP                         Exercise Plan  Goals Next 30 days  STG-- Pt will resume exercise at the gym for 40-45 mins, 4-7x/week at 5.2-6 mets level. LTG-- Pt will resume all housework, PT work at a [employer], play pickleball with met levels of 5-6.       Education Goals: All goals in this section met    Education Goals Met: Patient can state cardiac s/s and appropriate emergency response.;Has system for taking medication.;Medication review.                          Goals Met    90 Day Progress: Pt on hold due to COVID-19 precautions. No changes from previous ITP      60 day ADL'S goals met: Will evaluate  goals when pt returns  she has been off since 2/24/2020    60 Day Progression: Will evaluate goals when pt returs       30 day ADL'S goals met: Goal Met - Pt is exercising at 4.9-5 mets for 40 minutes and has resumed most activities as tolerated.    30 Day Progression: Pt is progressing well towards her goals, pt has resumed working PT, resuming household chores as tolerated and has returned to the gym on day she is not at [facility].      Initial ADL's goals met: Goal Met - Pt is exercising at 4.2 mets without complaints.    Initial Progression: Pt is progressing towards goals as tolerated.      Exercise Prescription  Exercise Mode: Treadmill;Bike;Nustep;Arm Erg.    Frequency: 2x/week    Duration: 40 minutes    Intensity / THR: 20-30 beats above resting heart rate ()    RPE 11-14  Progression / Met level: 4.9-6    Resistive Training?: Yes      Current Exercise (mins/week): 120      Interventions  Home Exercise:  Mode: Walk    Frequency: 4-6 days/week    Duration: 40 minutes      Education Material  ": Educational videos;Provide written material;Individual education and counseling;Offer educational classes      Education Completed  Exercise Education Completed: Cardiac Anatomy;Signs and Symptoms;Medication review;RPE;Emergency Plan;Home Exercise;Warm up/cool down;FITT Principles;BP/HR Reponse to exercise;Benefits of Exercise;End point of exercise              Exercise Follow-up/Discharge  Follow up/Discharge: Skilled therapy needed to monitor for CV sx's and EKG changes with increasing workloads. Provide education and support for risk factor management/Stress management and resuming of home exercise after heart event.   NUTRITION  Nutrition Assessment: Reassessment      Nutrition Risk Factors:  Nutrition Risk Factors: NA      Nutrition Plan  Interventions    Other Nutrition Intervention: Diet Class;Therapist/Pt Discussion;Educational Videos;Provide with Written Material      Education Completed  Nutrition Education Completed: Risk factor overview;Low Saturated fat diet;Low sodium diet;Weight management      Goals  Nutrition Goals (Next 30 days): Patient will follow a low sodium diet;Patient will follow a low saturated fat diet      Goals Met  Nutrition Goals Met: Completed Nutritional Risk Screen;Provided Rate your Plate Survey;Reviewed Dietitian schedule      Height, Weight, and  BMI  Weight: 137 lb (62.1 kg)  Height: 5' 4\" (1.626 m)  BMI: 23.5      Nutrition Follow-up  Follow-up/Discharge: Enc pt to return diet suvey and consult with the dietitian         Other Risk Factors  Other Risk Factor Assessment: Reassessment      HTN Risk Factor: Hypertension      Hypertension Plan  Goals  HTN Goals: Follow low sodium diet      Goals Met  HTN Goals Met: Take medication as prescribed;Exercises regularly      HTN Interventions  HTN Interventions: Diet consult;Therapist/patient discussion;Provide written material;Offer educational videos;Offer educational classes      HTN Education Completed  HTN Education Completed: " Medication review;Risk factor overview      Tobacco Risk Factor: NA      Risk Factor Follow-up   Follow-up/Discharge: Will provide ed and support as needed     PSYCHOSOCIAL  Psychosocial Assessment: Reassessment       [facility] Q [facility] L Summary Score: 17      PHQ-9 Total Score: 3      Psychosocial Risk Factor: Stress      Psychosocial Plan  Interventions  Interventions: Offer educational videos and classes;Provide written material;Individual education and counseling      Education Completed  Education Completed: Relaxation/Coping Techniques;S/S of depression;Effects of stress on body      Goals  Goals (Next 30 days): Identify stressors;Improvement in [employer] COOP score;Practicing stress management skills      Goals Met  Goals Met: Identified Support system;Oriented to stress management classes      Psychosocial Follow-up  Follow-up/Discharge: Will review stress/ relaxation techniqes when pt resumes             Patient involved in Goal setting?: No      Signature: _____________________________________________________________    Date: __________________    Time: __________________

## 2021-06-08 NOTE — PROGRESS NOTES
ITP ASSESSMENT   Assessment Day: 150 Day    Session Number: 0  Precautions: Standard    Diagnosis: Other (Stress Induced Cardiomyopathy)    Risk Stratification: Medium    Referring Provider: Krystal Lr PA-C   ITP: Dr. Shine  EXERCISE  Exercise Assessment: Discharge    Pt has been on hold due to Covid-19. Pt was tolerating 5 mets of exercise for up to 40 minutes without cardiovascular symptoms or EKG changes. Pt was contacted regarding resuming CR but has not returned phone call. We will be discharging her from our care.                         Exercise Plan    Education Goals: All goals in this section met    Education Goals Met: Patient can state cardiac s/s and appropriate emergency response.;Has system for taking medication.;Medication review.                          Goals Met  120 Day Progress: Pt has been on hold due to Covid-19. Pt has not returned phone call to resume CR, therefore we will be discharging her from our care.    90 Day Progress: Pt on hold due to COVID-19 precautions. No changes from previous ITP      60 day ADL'S goals met: Will evaluate  goals when pt returns  she has been off since 2/24/2020    60 Day Progression: Will evaluate goals when pt returs       30 day ADL'S goals met: Goal Met - Pt is exercising at 4.9-5 mets for 40 minutes and has resumed most activities as tolerated.    30 Day Progression: Pt is progressing well towards her goals, pt has resumed working PT, resuming household chores as tolerated and has returned to the gym on day she is not at [facility].      Exercise Prescription  Exercise Mode: Treadmill;Bike;Nustep;Arm Erg.    Frequency: 2x/week    Duration: 40 minutes    Intensity / THR: 20-30 beats above resting heart rate ()    RPE 11-14  Progression / Met level: 4.9-6    Resistive Training?: Yes      Current Exercise (mins/week): 120      Interventions  Home Exercise:  Mode: Walk    Frequency: 4-6x/week    Duration:  40 Minutes      Education Material : Educational  "videos;Provide written material;Individual education and counseling;Offer educational classes      Education Completed  Exercise Education Completed: Cardiac Anatomy;Signs and Symptoms;Medication review;RPE;Emergency Plan;Home Exercise;Warm up/cool down;FITT Principles;BP/HR Reponse to exercise;Benefits of Exercise;End point of exercise              Exercise Follow-up/Discharge  Follow up/Discharge: Skilled therapy needed to monitor for CV sx's and EKG changes with increasing workloads. Provide education and support for risk factor management/Stress management and resuming of home exercise after heart event.   NUTRITION  Nutrition Assessment: Discharge      Nutrition Risk Factors:  Nutrition Risk Factors: NA      Nutrition Plan  Interventions  Other Nutrition Intervention: Diet Class;Therapist/Pt Discussion;Educational Videos;Provide with Written Material      Education Completed  Nutrition Education Completed: Risk factor overview;Low Saturated fat diet;Low sodium diet;Weight management      Goals  Nutrition Goals (Next 30 days): Patient will follow a low sodium diet;Patient will follow a low saturated fat diet      Goals Met  Nutrition Goals Met: Completed Nutritional Risk Screen;Provided Rate your Plate Survey;Reviewed Dietitian schedule      Height, Weight, and  BMI  Weight: 137 lb (62.1 kg)  Height: 5' 4\" (1.626 m)  BMI: 23.5    Pre BMI: 23.5     Nutrition Follow-up  Follow-up/Discharge: Enc pt to return diet suvey and consult with the dietitian         Other Risk Factors  Other Risk Factor Assessment: Discharge      HTN Risk Factor: Hypertension      Hypertension Plan  Goals  HTN Goals: Follow low sodium diet      Goals Met  HTN Goals Met: Take medication as prescribed;Exercises regularly      HTN Interventions  HTN Interventions: Diet consult;Therapist/patient discussion;Provide written material;Offer educational videos;Offer educational classes      HTN Education Completed  HTN Education Completed: " Medication review;Risk factor overview      Tobacco Risk Factor: NA      Risk Factor Follow-up   Follow-up/Discharge: Will provide ed and support as needed     PSYCHOSOCIAL  Psychosocial Assessment: Discharge    [facility]: 17     Pre PHQ-9 Total Score: 3     Psychosocial Risk Factor: Stress      Psychosocial Plan  Interventions  If PHQ-9 is >9, send letter to MD  Interventions: Offer educational videos and classes;Provide written material;Individual education and counseling      Education Completed  Education Completed: Relaxation/Coping Techniques;S/S of depression;Effects of stress on body      Goals  Goals (Next 30 days): Identify stressors;Improvement in [place] COOP score;Practicing stress management skills      Goals Met  Goals Met: Identified Support system;Oriented to stress management classes      Psychosocial Follow-up  Follow-up/Discharge: Will review stress/ relaxation techniqes when pt resumes         Patient involved in Goal setting?: No      Signature: _____________________________________________________________    Date: __________________    Time: __________________

## 2021-06-08 NOTE — PROGRESS NOTES
Plan:  Start losartan 25 mg a day for blood pressure and enlargement of the aorta  Reassess aortic regurgitation and size of the aorta with echo in 6 months  We discussed lifestyle modifications including regular exercise and avoidance of excessive alcohol use.  I cautioned against heavy lifting     Echo and follow-up in 6 months

## 2021-06-10 NOTE — PATIENT INSTRUCTIONS - HE
Christine Somers,    It was a pleasure to see you today at the [facility].     My recommendations after this visit include:    Continue current medications  Cardiac MR next year before follow-up visit    RAIMUNDO Diaz MD, FACC, [facility]

## 2021-06-15 NOTE — TELEPHONE ENCOUNTER
Order is in; pt updated via [employer] that central scheduling will reach out to arrange. -[facility] [place]

## 2021-06-15 NOTE — TELEPHONE ENCOUNTER
MRI is asking if need aorta as well.  Per note - aorta is needed.    Valvular heart disease, follow up   Dx: Aortic dilatation (H) [I77.819 (ICD-10-CM)]; Stress-induced cardiomyopathy [I51.81 (ICD-10-CM)]; Left ventricular hypertrophy [I51.7 (ICD-10-CM)]   Comments: Assessment/Plan:   Chest pain, likely musculoskeletal, resolved   Stress cardiomyopathy, resolved   Hypertension, labile, element of whitecoat hypertension   Mild dilatation of ascending aorta, stable   Mild aortic regurgitation secondary to aortic dilatation, stable, asymptomatic   Mild left ventricular hypertrophy likely due to hypertensive heart, possible latent hypertrophic cardiomyopathy   Family history of sudden death           We discussed the results of the recent echo.  LV systolic function remains normal.  There is only trace to mild aortic regurgitation.  She brought up issue of left ventricular hypertrophy noted on the echo.  She reminded me that her sister  exercising in her 30s.  She did not have pre-existing cardiac condition or autopsy.   I offered her cardiac MR to further define LV hypertrophy.  She would like to postpone the testing until next year.   We will perform cardiac MR next year to assess LV hypertrophy, aortic valve regurgitation and size of the ascending aorta

## 2021-06-16 PROBLEM — I51.81 STRESS-INDUCED CARDIOMYOPATHY: Status: ACTIVE | Noted: 2019-12-19

## 2021-06-16 PROBLEM — R07.2 PRECORDIAL PAIN: Status: ACTIVE | Noted: 2020-03-16

## 2021-06-16 PROBLEM — I77.819 AORTIC DILATATION (H): Status: ACTIVE | Noted: 2020-01-27

## 2021-06-16 NOTE — PATIENT INSTRUCTIONS - HE
Christine Somers,    It was a pleasure to see you today at the [facility].     My recommendations after this visit include:    Increase losartan to 50 mg a day  Ideal blood pressure should be around 120/85.  If it stays higher then that, contact me    We will do echocardiogram next year    RAIMUNDO Diaz MD, FACC, [facility]

## 2021-06-17 NOTE — TELEPHONE ENCOUNTER
Telephone Encounter by Trish Salvador RN at 2021  9:33 AM     Author: Trish Salvador RN Service: -- Author Type: Registered Nurse    Filed: 2021  9:38 AM Encounter Date: 2021 Status: Signed    : Trish Salvador RN (Registered Nurse)       Per 2020 OV note with [facility]:    Assessment/Recommendations   Assessment/Plan:  Chest pain, likely musculoskeletal, resolved  Stress cardiomyopathy, resolved  Hypertension, labile, element of whitecoat hypertension  Mild dilatation of ascending aorta, stable  Mild aortic regurgitation secondary to aortic dilatation, stable, asymptomatic  Mild left ventricular hypertrophy likely due to hypertensive heart, possible latent hypertrophic cardiomyopathy  Family history of sudden death        We discussed the results of the recent echo.  LV systolic function remains normal.  There is only trace to mild aortic regurgitation.  She brought up issue of left ventricular hypertrophy noted on the echo.  She reminded me that her sister  exercising in her 30s.  She did not have pre-existing cardiac condition or autopsy.  I offered her cardiac MR to further define LV hypertrophy.  She would like to postpone the testing until next year.  We will perform cardiac MR next year to assess LV hypertrophy, aortic valve regurgitation and size of the ascending aorta           Per [employer] message from patient:    Christine Somers S  to Franck Diaz MD (Ted)          21 9:11 AM  I was to be contacted for follow up testing for my enlarged aorta.  Dr NERI and I had discussed an MRI prior to my visit with him.    I would like to get these appointments scheduled.              KRISTINE Banerjee- I placed order for Cardiac MR based on your last OV with Christine in July. Recall is in 1 year with cardiac MR prior. She sent you a message to have this done now- would you be ok with her having this now or should I have her wait until summer?   Thanks,  Mal

## 2021-06-28 NOTE — PROGRESS NOTES
"Progress Notes by Franck Diaz MD (Ted) at 1/27/2020  8:50 AM     Author: Franck Diaz MD (Ted) Service: -- Author Type: Physician    Filed: 1/27/2020  9:48 AM Encounter Date: 1/27/2020 Status: Signed    : Franck Diaz MD (Ted) (Physician)           Cardiology Progress Note    Assessment:  Stress cardiomyopathy resolved  Hypertension, labile, element of whitecoat hypertension  Mild dilatation of ascending aorta  Mild aortic regurgitation secondary to aortic dilatation      Plan:  Start losartan 25 mg a day for blood pressure and enlargement of the aorta  Reassess aortic regurgitation and size of the aorta with echo in 6 months  We discussed lifestyle modifications including regular exercise and avoidance of excessive alcohol use.  I cautioned against heavy lifting    Echo and follow-up in 6 months    Subjective:   This is 55 y.o. female who comes in today for follow-up visit.  She reports no new cardiac symptoms.  She is been attending cardiac rehab program.  She has not had chest pain or shortness of breath.  She denies heart palpitations.    Review of Systems:   General: WNL  Eyes: WNL  Ears/Nose/Throat: WNL  Lungs: WNL  Heart: WNL  Stomach: WNL  Bladder: WNL  Muscle/Joints: Joint Pain  Skin: WNL  Nervous System: WNL  Mental Health: Anxiety     Blood: WNL    Objective:   /80 (Patient Site: Left Arm, Patient Position: Sitting, Cuff Size: Adult Regular)   Pulse 69   Resp 14   Ht 5' 4\" (1.626 m)   Wt 141 lb (64 kg)   BMI 24.20 kg/m    Physical Exam:  GENERAL: no distress  NECK: No JVD  LUNGS: Clear to auscultation.  CARDIAC: regular rhythm, S1 & S2 normal.  No heaves, thrills, gallops or murmurs.  ABDOMEN: flat, negative hepatosplenomegaly, soft and non-tender.  EXTREMITIES: No evidence of cyanosis, clubbing or edema.    Current Outpatient Medications   Medication Sig Dispense Refill   ? aspirin 81 mg chewable tablet Chew 1 tablet (81 mg total) daily.  0 "   ? losartan (COZAAR) 25 MG tablet Take 1 tablet (25 mg total) by mouth daily. 30 tablet 12     No current facility-administered medications for this visit.        Cardiographics:      Echocardiogram: January 2020    Normal left ventricular size. Mild sigmoid left ventricular hypertrophy    Left ventricle ejection fraction is normal. Left ventricular ejection fraction estimated 55 to 60%    Normal right ventricular size and systolic function.    Mild aortic regurgitation    Mild mitral regurgitation    Mild enlargement of the ascending aorta measuring 4.1 cm    When compared to the previous study dated 12/20/2019, the previously described wall motion abnormality has resolved in the interim.    Coronary angio: December 2019  Normal coronary angiogram  LV gram showed large apical wma consistent with stress cardiomyopathy  Lab Results:       No results found for: CHOL  No results found for: HDL  No results found for: LDLCALC  No results found for: TRIG  No results found for: BNP    Franck (Jeb)  MD Emily

## 2021-06-28 NOTE — PROGRESS NOTES
"Progress Notes by Franck Diaz MD (Ted) at 3/16/2020  2:50 PM     Author: Franck Diaz MD (Ted) Service: -- Author Type: Physician    Filed: 3/16/2020  3:18 PM Encounter Date: 3/16/2020 Status: Signed    : Franck Diaz MD (Ted) (Physician)           Cardiology Progress Note    Assessment:  Chest pain, likely musculoskeletal  Stress cardiomyopathy resolved  Hypertension, labile, element of whitecoat hypertension  Mild dilatation of ascending aorta  Mild aortic regurgitation secondary to aortic dilatation    Plan:  She had normal physical exam exam.  I reassured her that stress cardiomyopathy or aortic dilatation/regurgitation has not worsen.  Chest x-ray    Follow-up in 6 months    Subjective:   This is 55 y.o. female who comes in today for follow-up for follow-up visit.  She developed left-sided chest pain.  The pain is located in the upper part of the chest close to left clavicle.  The pain is nonexertional and unrelated to motion of the arms.  She can reproduce some of the pain with palpations of the upper chest.  There is no associated shortness of breath or heart palpitations.  She has been able to exercise without provocation of the same chest discomfort.    Review of Systems:   General: WNL  Eyes: WNL  Ears/Nose/Throat: WNL  Lungs: WNL  Heart: Chest Pain, Arm Pain  Stomach: WNL  Bladder: WNL  Muscle/Joints: WNL  Skin: WNL  Nervous System: WNL  Mental Health: Anxiety     Blood: WNL    Objective:   /80 (Patient Site: Left Arm, Patient Position: Sitting, Cuff Size: Adult Regular)   Pulse 84   Resp 20   Ht 5' 4\" (1.626 m)   Wt 138 lb 4.8 oz (62.7 kg)   BMI 23.74 kg/m    Physical Exam:  GENERAL: no distress  NECK: No JVD  LUNGS: Clear to auscultation.  CARDIAC: regular rhythm, S1 & S2 normal.  No heaves, thrills, gallops or murmurs.  ABDOMEN: flat, negative hepatosplenomegaly, soft and non-tender.  EXTREMITIES: No evidence of cyanosis, clubbing or " edema.    Current Outpatient Medications   Medication Sig Dispense Refill   ? aspirin 81 mg chewable tablet Chew 1 tablet (81 mg total) daily.  0   ? LORazepam (ATIVAN) 1 MG tablet TAKE 1 TABLET BY MOUTH DAILY AS NEEDED FOR UP TO 30 DAYS.     ? losartan (COZAAR) 25 MG tablet Take 1 tablet (25 mg total) by mouth daily. 30 tablet 12     No current facility-administered medications for this visit.        Cardiographics:    Echocardiogram: January 2020    Normal left ventricular size. Mild sigmoid left ventricular hypertrophy    Left ventricle ejection fraction is normal. Left ventricular ejection fraction estimated 55 to 60%    Normal right ventricular size and systolic function.    Mild aortic regurgitation    Mild mitral regurgitation    Mild enlargement of the ascending aorta measuring 4.1 cm    When compared to the previous study dated 12/20/2019, the previously described wall motion abnormality has resolved in the interim.     Coronary angio: December 2019  Normal coronary angiogram  LV gram showed large apical wma consistent with stress cardiomyopathy      Franck Diaz MD (Ted)

## 2021-06-29 NOTE — PROGRESS NOTES
"Progress Notes by Franck Diaz MD (Ted) at 2020 10:10 AM     Author: Franck Diaz MD (Ted) Service: -- Author Type: Physician    Filed: 2020 10:36 AM Encounter Date: 2020 Status: Signed    : Franck Diaz MD (Ted) (Physician)           The patient has been notified of following:     \"This video visit will be conducted via a call between you and your physician/provider. We have found that certain health care needs can be provided without the need for an in-person physical exam.  This service lets us provide the care you need with a video conversation.  If a prescription is necessary we can send it directly to your pharmacy.  If lab work is needed we can place an order for that and you can then stop by our lab to have the test done at a later time.      Patient has given verbal consent to a Video visit? Yes    HEART CARE VIDEO ENCOUNTER        The patient has chosen to have the visit conducted as a video visit, to reduce risk of exposure given the current status of Coronavirus in our community. This video visit is being conducted via a call between the patient and physician/provider. Health care needs are being provided without a physical exam.     Assessment/Recommendations   Assessment/Plan:  Chest pain, likely musculoskeletal, resolved  Stress cardiomyopathy, resolved  Hypertension, labile, element of whitecoat hypertension  Mild dilatation of ascending aorta, stable  Mild aortic regurgitation secondary to aortic dilatation, stable, asymptomatic  Mild left ventricular hypertrophy likely due to hypertensive heart, possible latent hypertrophic cardiomyopathy  Family history of sudden death      We discussed the results of the recent echo.  LV systolic function remains normal.  There is only trace to mild aortic regurgitation.  She brought up issue of left ventricular hypertrophy noted on the echo.  She reminded me that her sister  exercising in her " 30s.  She did not have pre-existing cardiac condition or autopsy.  I offered her cardiac MR to further define LV hypertrophy.  She would like to postpone the testing until next year.  We will perform cardiac MR next year to assess LV hypertrophy, aortic valve regurgitation and size of the ascending aorta    Follow Up Plan: 12 months   I have reviewed the note as documented.  This accurately captures the substance of my conversation with the patient.    Total time of video between patient and provider was 20 minutes   Start Time: 10:10 AM  Stop Time: 30 a.m.    Originating Location (pt. Location): Home    Distant Location (provider location):  [facility]     Mode of Communication:  Video Conference via [employer]       History of Present Illness/Subjective    Christine Somers is a 55 y.o. female who is being evaluated via a billable video visit and has consented to a video visit. Christine Somers reports no new cardiac symptoms.  Left-sided chest pain has resolved without specific intervention.  She remains physically active.  She denies weight gain, PND, orthopnea.  She tolerates medication well.  Recently she underwent follow-up echo.      I have reviewed and updated the patient's Past Medical History, Social History, Family History and Medication List.     Physical Examination performed via live video encounter Review of Systems   General Appearance:   no distress, normal body habitus, upright.   ENT/Mouth: membranes moist, no nasal discharge or bleeding gums.  Normal head shape, no evidence of injury or laceration.     EYES:  no scleral icterus, normal conjunctivae   Neck: no evidence of thyromegaly.  Supple   Chest/Lungs:   No audible wheezing equal chest wall expansion. Non labored breathing.  No cough.   Cardiovascular:   No evidence of elevated jugular venous pressure.  No evidence of pitting edema bilaterally    Abdomen:  no evidence of abdominal distention. No observe juandice.     Extremities: no  cyanosis or clubbing noted.    Skin: no xanthelasma, normal skin color. No evidence of facial lacerations.      Neurologic: Normal arm motion bilateral, no tremors.  No evidence of focal defect.       Psychiatric: alert and oriented x3, calm                                               Medical History  Surgical History Family History Social History   Past Medical History:   Diagnosis Date   ? Chest pain with normal coronary angiography 2019    Past Surgical History:   Procedure Laterality Date   ? CV CORONARY ANGIOGRAM N/A 2019    Procedure: Coronary Angiogram;  Surgeon: Vinayak Beckman MD;  Location: [facility] Cath Lab;  Service: Cardiology   ? CV LEFT HEART CATHETERIZATION WITH LEFT VENTRICULOGRAM N/A 2019    Procedure: Left Heart Catheterization with Left Ventriculogram;  Surgeon: Vinayak Beckman MD;  Location: [facility] Cath Lab;  Service: Cardiology    Sister  suddenly in her 30s while exercising   Social History     Socioeconomic History   ? Marital status:      Spouse name: Not on file   ? Number of children: Not on file   ? Years of education: Not on file   ? Highest education level: Not on file   Occupational History   ? Not on file   Social Needs   ? Financial resource strain: Not on file   ? Food insecurity     Worry: Not on file     Inability: Not on file   ? Transportation needs     Medical: Not on file     Non-medical: Not on file   Tobacco Use   ? Smoking status: Never Smoker   ? Smokeless tobacco: Never Used   Substance and Sexual Activity   ? Alcohol use: Not on file   ? Drug use: Never   ? Sexual activity: Not on file   Lifestyle   ? Physical activity     Days per week: Not on file     Minutes per session: Not on file   ? Stress: Not on file   Relationships   ? Social connections     Talks on phone: Not on file     Gets together: Not on file     Attends Buddhism service: Not on file     Active member of club or organization: Not on file     Attends  meetings of clubs or organizations: Not on file     Relationship status: Not on file   ? Intimate partner violence     Fear of current or ex partner: Not on file     Emotionally abused: Not on file     Physically abused: Not on file     Forced sexual activity: Not on file   Other Topics Concern   ? Not on file   Social History Narrative   ? Not on file          Medications  Allergies   Current Outpatient Medications   Medication Sig Dispense Refill   ? escitalopram oxalate (LEXAPRO) 10 MG tablet Take 10 mg by mouth daily.     ? losartan (COZAAR) 25 MG tablet Take 1 tablet (25 mg total) by mouth daily. 30 tablet 12   ? aspirin 81 mg chewable tablet Chew 1 tablet (81 mg total) daily.  0   ? LORazepam (ATIVAN) 1 MG tablet TAKE 1 TABLET BY MOUTH DAILY AS NEEDED FOR UP TO 30 DAYS.       No current facility-administered medications for this visit.     No Known Allergies      Lab Results    Chemistry/lipid CBC Cardiac Enzymes/BNP/TSH/INR   Lab Results   Component Value Date    CREATININE 0.76 12/20/2019    BUN 20 12/20/2019    K 4.1 12/20/2019     12/20/2019     (H) 12/20/2019    CO2 20 (L) 12/20/2019    Lab Results   Component Value Date    WBC 15.2 (H) 12/19/2019    HGB 11.9 (L) 12/20/2019    HCT 42.4 12/19/2019    MCV 92 12/19/2019     12/19/2019    Lab Results   Component Value Date    TROPONINI 4.07 (HH) 12/20/2019    INR 1.03 12/19/2019        Franck Diaz (Ted)

## 2021-06-30 NOTE — PROGRESS NOTES
"Progress Notes by Franck Diaz MD (Ted) at 3/30/2021  8:50 AM     Author: Franck Diaz MD (Ted) Service: -- Author Type: Physician    Filed: 3/30/2021  9:13 AM Encounter Date: 3/30/2021 Status: Signed    : Franck Diaz MD (Ted) (Physician)           Cardiology Progress Note    Assessment:  Stress cardiomyopathy, resolved  Hypertension, labile, element of whitecoat hypertension  Mild dilatation of ascending aorta, stable  Mild aortic regurgitation secondary to aortic dilatation      Plan:  Increase losartan to 50 mg a day.  Ideally systolic blood pressure should be around 120 and diastolic less than 85    Reassess size of the ascending aorta and severity of aortic regurgitation with echo next year    Follow-up in 1 year    Subjective:   This is 56 y.o. female who comes in today for visit.  She has done well.  She denies exertional chest pain or shortness of breath.  She exercises regularly.  She plays the [employer] ball.  She has not had any chest discomfort during those activities.  She does not check blood pressure at home.  She has not had heart palpitations or syncope.    Review of Systems:   General: WNL  Eyes: WNL  Ears/Nose/Throat: WNL  Lungs: WNL  Heart: WNL  Stomach: WNL  Bladder: WNL  Muscle/Joints: WNL  Skin: WNL  Nervous System: WNL  Mental Health: Anxiety     Blood: WNL    Objective:   BP (!) 140/92 (Patient Site: Left Arm, Patient Position: Sitting, Cuff Size: Adult Regular)   Pulse 68   Resp 14   Ht 5' 4\" (1.626 m)   Wt 142 lb (64.4 kg)   BMI 24.37 kg/m    Physical Exam:  GENERAL: no distress  NECK: No JVD  LUNGS: Clear to auscultation.  CARDIAC: regular rhythm, S1 & S2 normal.  No heaves, thrills, gallops or murmurs.  ABDOMEN: flat, negative hepatosplenomegaly, soft and non-tender.  EXTREMITIES: No evidence of cyanosis, clubbing or edema.    Current Outpatient Medications   Medication Sig Dispense Refill   ? escitalopram oxalate (LEXAPRO) 10 MG " tablet Take 10 mg by mouth daily.     ? aspirin 81 mg chewable tablet Chew 1 tablet (81 mg total) daily.  0   ? LORazepam (ATIVAN) 1 MG tablet TAKE 1 TABLET BY MOUTH DAILY AS NEEDED FOR UP TO 30 DAYS.     ? losartan (COZAAR) 50 MG tablet Take 1 tablet (50 mg total) by mouth daily. 30 tablet 12     No current facility-administered medications for this visit.        Cardiographics:    Echocardiogram: January 2020    Normal left ventricular size. Mild sigmoid left ventricular hypertrophy    Left ventricle ejection fraction is normal. Left ventricular ejection fraction estimated 55 to 60%    Normal right ventricular size and systolic function.    Mild aortic regurgitation    Mild mitral regurgitation    Mild enlargement of the ascending aorta measuring 4.1 cm    When compared to the previous study dated 12/20/2019, the previously described wall motion abnormality has resolved in the interim.     Coronary angio: December 2019  Normal coronary angiogram  LV gram showed large apical wma consistent with stress cardiomyopathy    Cardiac MR: March 2021  1.  Mild dilatation of the ascending aorta with a maximal dimension of 41 mm at the level of the mid ascending aorta.  2.  Trileaflet aortic valve with no significant stenosis or regurgitation.  3.  Left ventricular cavity size, wall thickness, and systolic function are normal.  The calculated left ventricular ejection fraction is 72%.   4.  Normal right ventricular size and  function.    5.  No focal myocardial scarring or fibrosis.        Franck Diaz MD (Ted)

## 2021-07-12 ENCOUNTER — TRANSFERRED RECORDS (OUTPATIENT)
Dept: HEALTH INFORMATION MANAGEMENT | Facility: CLINIC | Age: 56
End: 2021-07-12

## 2021-07-25 ENCOUNTER — HEALTH MAINTENANCE LETTER (OUTPATIENT)
Age: 56
End: 2021-07-25

## 2021-09-19 ENCOUNTER — HEALTH MAINTENANCE LETTER (OUTPATIENT)
Age: 56
End: 2021-09-19

## 2022-08-20 ENCOUNTER — HEALTH MAINTENANCE LETTER (OUTPATIENT)
Age: 57
End: 2022-08-20

## 2022-09-19 ENCOUNTER — MYC MEDICAL ADVICE (OUTPATIENT)
Dept: CARDIOLOGY | Facility: CLINIC | Age: 57
End: 2022-09-19

## 2022-09-19 DIAGNOSIS — I51.81 STRESS-INDUCED CARDIOMYOPATHY: Primary | ICD-10-CM

## 2022-09-19 DIAGNOSIS — I77.819 AORTIC DILATATION (H): ICD-10-CM

## 2022-09-19 NOTE — TELEPHONE ENCOUNTER
Jeb Diaz MD Caswell, Mallory J, RN  She should get echo and follow-up before she goes to [place]         Orders placed; responded to patient and msg to schedulers. -bren

## 2022-09-27 ENCOUNTER — HOSPITAL ENCOUNTER (OUTPATIENT)
Dept: CARDIOLOGY | Facility: CLINIC | Age: 57
Discharge: HOME | End: 2022-09-27
Attending: INTERNAL MEDICINE | Admitting: INTERNAL MEDICINE
Payer: COMMERCIAL

## 2022-09-27 DIAGNOSIS — I51.81 STRESS-INDUCED CARDIOMYOPATHY: ICD-10-CM

## 2022-09-27 DIAGNOSIS — I77.819 AORTIC DILATATION (H): ICD-10-CM

## 2022-09-27 LAB — LVEF ECHO: NORMAL

## 2022-09-27 PROCEDURE — 93306 TTE W/DOPPLER COMPLETE: CPT | Mod: 26 | Performed by: INTERNAL MEDICINE

## 2022-09-27 PROCEDURE — 93306 TTE W/DOPPLER COMPLETE: CPT

## 2022-11-20 ENCOUNTER — HEALTH MAINTENANCE LETTER (OUTPATIENT)
Age: 57
End: 2022-11-20

## 2023-09-16 ENCOUNTER — HEALTH MAINTENANCE LETTER (OUTPATIENT)
Age: 58
End: 2023-09-16

## 2023-12-19 ENCOUNTER — HOSPITAL ENCOUNTER (OUTPATIENT)
Dept: CARDIOLOGY | Facility: HOSPITAL | Age: 58
Discharge: HOME | End: 2023-12-19
Attending: INTERNAL MEDICINE | Admitting: INTERNAL MEDICINE
Payer: COMMERCIAL

## 2023-12-19 DIAGNOSIS — I77.819 AORTIC DILATATION (H): ICD-10-CM

## 2023-12-19 DIAGNOSIS — I51.81 STRESS-INDUCED CARDIOMYOPATHY: ICD-10-CM

## 2023-12-19 LAB — LVEF ECHO: NORMAL

## 2023-12-19 PROCEDURE — 93306 TTE W/DOPPLER COMPLETE: CPT | Mod: 26 | Performed by: INTERNAL MEDICINE

## 2023-12-19 PROCEDURE — 93306 TTE W/DOPPLER COMPLETE: CPT

## 2024-09-03 ENCOUNTER — TRANSFERRED RECORDS (OUTPATIENT)
Dept: HEALTH INFORMATION MANAGEMENT | Facility: CLINIC | Age: 59
End: 2024-09-03
Payer: COMMERCIAL

## 2024-09-04 ENCOUNTER — PRE VISIT (OUTPATIENT)
Dept: ONCOLOGY | Facility: CLINIC | Age: 59
End: 2024-09-04
Payer: COMMERCIAL

## 2024-09-04 ENCOUNTER — PATIENT OUTREACH (OUTPATIENT)
Dept: ONCOLOGY | Facility: CLINIC | Age: 59
End: 2024-09-04
Payer: COMMERCIAL

## 2024-09-04 ENCOUNTER — TRANSCRIBE ORDERS (OUTPATIENT)
Dept: OTHER | Age: 59
End: 2024-09-04

## 2024-09-04 DIAGNOSIS — C91.10 CLL (CHRONIC LYMPHOCYTIC LEUKEMIA) (H): Primary | ICD-10-CM

## 2024-09-04 NOTE — TELEPHONE ENCOUNTER
RECORDS STATUS - ALL OTHER DIAGNOSIS      RECORDS RECEIVED FROM: [facility] in [place]    Appt Date: TBD NN WQ    CLL (chronic lymphocytic leukemia) (H)   Action    Action Taken 9/4/2024 12:36pm SHAHNAZ     I called [facility] Ph: [phone]- I left a detailed vm requesting a call back.     9/4/2024 1:05pm KEB   [facility] called back- they don't have any related recs. They will fax over records from [facility].       NOTES STATUS DETAILS   OFFICE NOTE from referring provider  Kevin Mathew MD    OFFICE NOTE from medical oncologist Requested Recs- [facility] and [facility]    DISCHARGE SUMMARY from hospital     DISCHARGE REPORT from the ER     OPERATIVE REPORT     MEDICATION LIST     CLINICAL TRIAL TREATMENTS TO DATE     LABS     PATHOLOGY REPORTS     ANYTHING RELATED TO DIAGNOSIS CE  Labs last updated on 8/28/2024    PATHOLOGY FED[facility] TRACKING   TRACKING #:   GENONOMIC TESTING     TYPE:     IMAGING (NEED IMAGES & REPORT)     CT SCANS     MRI     XRAYS     ULTRASOUND     PET     IMAGE DISC FED[employer] TRACKING   TRACKING #:

## 2024-09-04 NOTE — PROGRESS NOTES
Writer placed call to patient to discuss self referral for CLL, per referral note, was recommended to see Dr Gonzalez. Left voicemail that we will need to gather additional records from [facility]. Also advised that Dr Gonzalez does not currently see patients for CLL/SLL, will update plan of care once records are received. Referral flagged per below:    Records Needed 9/4: Please call patient to obtain dermatology provider info and obtain records from that facility, will need visit notes, I already have the lab information needed.

## 2024-09-06 NOTE — PROGRESS NOTES
Per email request, Dr Gonzalez has agreed to see for diagnosis. Records in process from dermatology who had requested labs. Referral updated for instructions to schedule with Dr Gonzalez.

## 2024-11-09 ENCOUNTER — HEALTH MAINTENANCE LETTER (OUTPATIENT)
Age: 59
End: 2024-11-09